# Patient Record
Sex: MALE | URBAN - METROPOLITAN AREA
[De-identification: names, ages, dates, MRNs, and addresses within clinical notes are randomized per-mention and may not be internally consistent; named-entity substitution may affect disease eponyms.]

---

## 2020-05-15 ENCOUNTER — APPOINTMENT (OUTPATIENT)
Dept: GENERAL RADIOLOGY | Facility: CLINIC | Age: 45
End: 2020-05-15
Attending: PHYSICIAN ASSISTANT
Payer: COMMERCIAL

## 2020-05-15 ENCOUNTER — APPOINTMENT (OUTPATIENT)
Dept: CT IMAGING | Facility: CLINIC | Age: 45
End: 2020-05-15
Attending: PHYSICIAN ASSISTANT
Payer: COMMERCIAL

## 2020-05-15 ENCOUNTER — HOSPITAL ENCOUNTER (EMERGENCY)
Facility: CLINIC | Age: 45
Discharge: HOME | End: 2020-05-15
Attending: PHYSICIAN ASSISTANT | Admitting: PHYSICIAN ASSISTANT
Payer: COMMERCIAL

## 2020-05-15 VITALS
SYSTOLIC BLOOD PRESSURE: 133 MMHG | RESPIRATION RATE: 16 BRPM | DIASTOLIC BLOOD PRESSURE: 79 MMHG | TEMPERATURE: 98.1 F | WEIGHT: 220 LBS | HEART RATE: 67 BPM | OXYGEN SATURATION: 99 %

## 2020-05-15 DIAGNOSIS — S06.0X9A CONCUSSION WITH LOSS OF CONSCIOUSNESS, INITIAL ENCOUNTER: ICD-10-CM

## 2020-05-15 DIAGNOSIS — S63.502A LEFT WRIST SPRAIN, INITIAL ENCOUNTER: ICD-10-CM

## 2020-05-15 DIAGNOSIS — S50.01XA CONTUSION OF RIGHT ELBOW, INITIAL ENCOUNTER: ICD-10-CM

## 2020-05-15 PROCEDURE — 73080 X-RAY EXAM OF ELBOW: CPT | Mod: RT

## 2020-05-15 PROCEDURE — 70450 CT HEAD/BRAIN W/O DYE: CPT

## 2020-05-15 PROCEDURE — 70486 CT MAXILLOFACIAL W/O DYE: CPT

## 2020-05-15 PROCEDURE — 73110 X-RAY EXAM OF WRIST: CPT | Mod: 50

## 2020-05-15 PROCEDURE — 99285 EMERGENCY DEPT VISIT HI MDM: CPT | Mod: 25

## 2020-05-15 RX ORDER — ACETAMINOPHEN 500 MG
1000 TABLET ORAL ONCE
Status: DISCONTINUED | OUTPATIENT
Start: 2020-05-15 | End: 2020-05-15 | Stop reason: HOSPADM

## 2020-05-15 RX ORDER — IBUPROFEN 600 MG/1
600 TABLET, FILM COATED ORAL ONCE
Status: DISCONTINUED | OUTPATIENT
Start: 2020-05-15 | End: 2020-05-15 | Stop reason: HOSPADM

## 2020-05-15 ASSESSMENT — ENCOUNTER SYMPTOMS
VOMITING: 0
ARTHRALGIAS: 1
COLOR CHANGE: 1
FACIAL SWELLING: 1
WOUND: 0
DIZZINESS: 1
NECK PAIN: 0

## 2020-05-15 NOTE — DISCHARGE INSTRUCTIONS
Discharge Instructions  Concussion    You were seen today for signs of a concussion.  The symptoms will vary, depending on the nature of your injury and your health. You may have: headache, confusion, nausea (feel sick to your stomach), vomiting (throwing up) and problems with memory, concentrating, or sleep. You may feel dizzy, irritable, and tired. Children and teens may need help from their parents, teachers, and coaches to watch for symptoms as they recover.    Generally, every Emergency Department visit should have a follow-up clinic visit with either a primary or a specialty clinic/provider. Please follow-up as instructed by your emergency provider today.     Return to the Emergency Department if:  Your headache gets worse or you start to have a really bad headache even with the recommended treatment plan.   You feel drowsier, have growing confusion, or slurred speech.   You keep repeating yourself.   You have strange behavior or are feeling more irritable.   You have a seizure.   You vomit (throw up) more than once.   You have trouble walking.   You have weakness or numbness.  Your neck pain gets worse.   You have a loss of consciousness.   You have blood for fluid coming from your ears or nose.   You have new symptoms or anything that worries you.     Home Care:  Get lots of rest and get enough sleep at night. Take daytime naps or rest if you feel tired.   Limit physical activity and  thinking  activities. These can make symptoms worse.   Physical activities include gym, sports, weight training, running, exercise, and heavy lifting.   Thinking activities include homework, class work, job-related work, and screen time (phone, computer, tablet, TV, and video games).   Stick to a healthy diet and drink lots of fluids. Avoid alcohol.  As symptoms improve, you may slowly return to your daily activities. If symptoms get worse or return, reduce your activity.   Know that it is normal to feel sad or frustrated when  you do not feel right and are less active.     Going Back to Work:  Your care team will tell you when you are ready to return to work.    Limit the amount of work you do soon after your injury. This may speed healing. Take breaks if your symptoms get worse. You should also reduce your physical activity as well as activities that require a lot of thinking until you see your doctor. You may need shorter work days and a lighter workload.  Avoid heavy lifting, working with machinery, driving and working at heights until your symptoms are gone or you are cleared by a provider.    Going Back to School:  If you are still having symptoms, you may need extra help at school.  Tell your teachers and school nurse about your injury and symptoms. Ask them to watch for problems with learning, memory, and concentrating. Symptoms may get worse when you do schoolwork, and you may become more irritable. You may need shorter school days, a reduced workload, and to postpone testing.  Do not drive or take gym class (physical activity) until cleared by a provider.    Returning to Sports:  Never return to play if you have any symptoms. A full recovery will reduce the chances of getting hurt again. Remember, it is better to miss one or two games than a whole season.  You should rest from all physical activity until you see your provider. Generally, if all symptoms have completely cleared, your provider can help guide you to slowly return to sports. If symptoms return or worsen, stop the activity and see your provider.  Important: If you are in an organized sport and under age 18, you will need written consent from a healthcare provider before you return to sports. Typically, this will be your primary care or sports medicine provider. Please make an appointment.    If you were given a prescription for medicine here today, be sure to read all of the information (including the package insert) that comes with your prescription.  This will  include important information about the medicine, its side effects, and any warnings that you need to know about.  The pharmacist who fills the prescription can provide more information and answer questions you may have about the medicine.  If you have questions or concerns that the pharmacist cannot address, please call or return to the Emergency Department.     Remember that you can always come back to the Emergency Department if you are not able to see your regular provider in the amount of time listed above, if you get any new symptoms, or if there is anything that worries you.  Discharge Instructions  Extremity Injury    You were seen today for an injury to an extremity (arm, hand, leg, or foot). You may have a bruise, strain, or fracture (broken bone).    Generally, every Emergency Department visit should have a follow-up clinic visit with either a primary or a specialty clinic/provider. Please follow-up as instructed by your emergency provider today.  Return to the Emergency Department right away if:  Your pain seems to change or get worse or there is pain in a new area that wasn t evaluated today.  Your extremity becomes pale, cool, blue, or numb or tingling past the injury.  You have more drainage, redness or pain in the area of the cut or abrasion.  You have pain that you cannot control with the medicine recommended or prescribed here, or you have pain that seems too much for your injury.  Your child (who is injured) will not stop crying or is much more fussy than normal.  You have new symptoms or anything that worries you.    What to Expect:  Your swelling and pain may be worse the day after your injury, but should not be severe and should start getting better after that. You should not have new symptoms and your pain should not get worse.  You may start to get a bruise over the injured area or below the injured area (bruising can follow gravity).  Your movement and strength should get better with  time.  Some injuries may not show up until after you have left the Emergency Department so it is important to follow-up as directed.  Your injury may prevent you from working.  Follow-up with your regular provider to get a work release note.  Pain medications or your injury may make it unsafe to drive or operate machinery.    Home Care:  RICE: Rest, Ice, Compression, Elevation  Rest: Rest your injured area for at least 1-2 days. After that you may start using your extremity again as long as there is not too much pain.   Ice: Apply ice your injured area for 15 minutes at a time, at least 3 times a day. Use a cloth between the ice bag and your skin to prevent frostbite. Do not sleep with an ice pack or heating pad on, since this can cause burns or skin injury.  Compression: You may use an elastic bandage (Ace  Wrap) if it makes you more comfortable. Wrap it just tight enough to provide light compression, like a new pair of socks feels. Loosen the bandage if you have swelling past the bandage.  Elevation: Raise the injured area above the level of your heart as much as possible in the first 1-2 days.    Use Tylenol  (acetaminophen), Motrin (ibuprofen), or Advil  (ibuprofen) for your pain unless you have an allergy or are told not to use these medications by your provider.  Take the medications as instructed on the package. Tylenol  (acetaminophen) is in many prescription medicines and non-prescription medicines--check all of your medicines to be sure you aren t taking more than 3000 mg per day.  Please follow any other instructions that were discussed with you by your provider.    Stretching/Exercises:  You may have been provided with instructions for stretching or exercises. If your injury was to your arm or shoulder and your provider put you in a sling or an immobilizer, it is important that you take off your immobilizer within 3 days and stretch/move your shoulder, unless your provider specifically tells you to not  move your shoulder.  This is to prevent further injury such as a  frozen shoulder .     If you were given a prescription for medicine here today, be sure to read all of the information (including the package insert) that comes with your prescription.  This will include important information about the medicine, its side effects, and any warnings that you need to know about.  The pharmacist who fills the prescription can provide more information and answer questions you may have about the medicine.  If you have questions or concerns that the pharmacist cannot address, please call or return to the Emergency Department.     Remember that you can always come back to the Emergency Department if you are not able to see your regular provider in the amount of time listed above, if you get any new symptoms, or if there is anything that worries you.

## 2020-05-15 NOTE — ED AVS SNAPSHOT
Emergency Department  [address] [place] [address]  Phone:  [phone]  Fax:  [phone]                                    Ankit Lopez   MRN: [id number]    Department:   Emergency Department   Date of Visit:  5/15/2020           After Visit Summary Signature Page    I have received my discharge instructions, and my questions have been answered. I have discussed any challenges I see with this plan with the nurse or doctor.    ..........................................................................................................................................  Patient/Patient Representative Signature      ..........................................................................................................................................  Patient Representative Print Name and Relationship to Patient    ..................................................               ................................................  Date                                   Time    ..........................................................................................................................................  Reviewed by Signature/Title    ...................................................              ..............................................  Date                                               Time          22EPIC Rev 08/18

## 2020-05-15 NOTE — ED PROVIDER NOTES
History     Chief Complaint:  Wrist Pain & Fall      HPI   A [employer] phone  was used to obtain the below history as well as to explain diagnosis, plan of treatment, reasons to return to the emergency department and appropriate follow up.   Ankit Lopez is an otherwise healthy 44 year old male who presents to the ED for evaluation of wrist pain after a fall. The patient reports that yesterday afternoon he tripped on a crack in the sidewalk and fell forward. He fell onto his outstretched arms and struck his left cheek against the concrete. The patient endorses loss of consciousness during the incident and was unable to get up for quite some time. Afterwards, he states that he felt disoriented and dizzy upon standing throughout the day, which has persisted into today. The patient also began to experience pain in the right cheek, bilateral wrists, and right elbow. He decided to present to the ED today secondary to the persistence of his wrist and face pain symptoms, and here he presents with bruising to the left cheek and left wrist. He additionally endorses some mild left patellar pain but has been ambulatory since the incident. He denies any neck pain, vomiting, or other injuries.  His dizziness has improved some.  He denies any weakness or tingling in the arms or legs.    Allergies:  No known drug allergies.     Medications:    The patient is currently on no regular medications.     Past Medical History:    History reviewed.  No significant past medical history.      Past Surgical History:    History reviewed. No pertinent past surgical history.     Family History:    History reviewed. No pertinent family history.     Social History:  Marital Status:  Single   Smoking status: No   Alcohol use: No       Review of Systems   HENT: Positive for facial swelling (and pain).    Gastrointestinal: Negative for vomiting.   Musculoskeletal: Positive for arthralgias (bilateral wrists, R elbow). Negative for  neck pain.   Skin: Positive for color change (bruising). Negative for wound.   Neurological: Positive for dizziness.   All other systems reviewed and are negative.    Physical Exam     Patient Vitals for the past 24 hrs:   BP Temp Temp src Pulse Resp SpO2 Weight   05/15/20 1510 133/79 98.1  F (36.7  C) Oral 67 16 99 % 99.8 kg (220 lb)        Physical Exam  General: Alert and cooperative with exam. Resting comfortably on gurney  Head:  Scalp is NC/AT without bruising, hematomas.  Eyes:  No scleral icterus, PERRL, EOMI   ENT:  The external nose and ears are normal.    TM's normal bilaterally    There is bruising over left Maxillary bone with tenderness.  Facial bones otherwise normal.     The oropharynx is normal without evidence of dental trauma or intraoral lacerations.  Neck:  Normal range of motion without rigidity. Able to rotate 45 degrees BL.  CV:  Regular rate and rhythm    No pathologic murmur, rubs, or gallops.  Resp:  Breath sounds are clear bilaterally.  No stridor in neck.    Non-labored, no retractions or accessory muscle use  Abdomen: Abdomen is soft, no distension, no tenderness, no masses.  Bowel sounds present in all quadrants.  No flank tenderness.  MS:  No midline cervical, thoracic, or lumbar tenderness    There is tenderness over left distal ulna with slight swelling.  There is tenderness over right elbow without swelling.  No anatomic snuffbox tenderness or pain with axial loading of thumbs BL.  No carpal tenderness BL.    PROM of all other major joints performed and unremarkable.  Skin:  Warm and dry, No bruising.  Neuro:  Oriented x 3. No gross motor deficits.    Strength and sensation grossly intact in all 4 extremities.  Cranial nerves  2-12 intact. GCS: 15. Normal finger to nose and heel to shin testing. Gait normal.  Psych: Awake. Alert. Normal affect. Appropriate interactions.      Emergency Department Course   Imaging:  Radiographic findings were communicated with the patient who voiced  understanding of the findings.  XR Elbow 3 views, Right:   No fractures are evident. No joint effusion, as per radiology.     XR Wrist 3 views, Bilateral:   Soft tissue swelling in the left wrist more prominent on   the ulnar margin. No fractures are evident. Normal joint spacing and   alignment, as per radiology.     CT Head without contrast:   Normal head CT.    Radiation dose for this scan was reduced using automated exposure   control, adjustment of the mA and/or kV according to patient size, or   iterative reconstruction technique.  As per radiology.    CT Facial Bones without contrast:   There are no facial bone fractures. The paranasal sinuses   are well aerated. The orbits and globes bilaterally are unremarkable.   The mastoid air cells are well aerated. Temporomandibular joint   alignment bilaterally is normal.    Radiation dose for this scan was reduced using automated exposure   control, adjustment of the mA and/or kV according to patient size, or   iterative reconstruction technique.   As per radiology.      Emergency Department Course:  Nursing notes and vitals reviewed. (1520) I performed an initial exam of the patient as documented above.     (1537)  acquired and further history obtained.    The patient was sent for x-rays of the bilateral wrists and right elbow, as well as CT's of the head and facial bones, while in the emergency department, findings above.     (1650) I rechecked the patient and discussed the results of his workup thus far.     Findings and plan explained to the Patient. Patient discharged home with instructions regarding supportive care, medications, and reasons to return. The importance of close follow-up was reviewed.    I personally reviewed the imaging results with the Patient and answered all related questions prior to discharge.     Impression & Plan    Medical Decision Makin yo male presents after mechanical fall.  Hx and records reviewed.  Fortunately Ct  scans of head and facial bones normal.  C-spine cleared clinically using NEXUS criteria.  X-rays of BL wrists show no evidence of fx or dislocation.  There is some left sided wrist swelling over distal ulna without laxity and thus will give brace and treat as sprain.  Doubt occult scaphoid fxs given exam findings. Elbow x-ray also normal though will ACE wrap as likely contusion.  Head to toe trauma exam otherwise reassuring.    My impression is concussion, and wrist and elbow soft tissue injuries.  Conservative treatment with RICE, NSAIDS, follow-up with pcp.  Advised that repeat imaging of joints will be indicated in 1 week if symptoms persist to exclude possibility of occult fxs.  Patient expressed understanding of this and will follow-up with a pcp or return if this is the case.  Advised to return sooner if other new or worsening sxs.     Diagnosis:    ICD-10-CM    1. Contusion of right elbow, initial encounter  S50.01XA    2. Left wrist sprain, initial encounter  S63.502A    3. Concussion with loss of consciousness, initial encounter  S06.0X9A        Disposition:  discharged to home    Discharge Medications:  None    Scribe Disclosure:  I, Humaira Barrow, am serving as a scribe on 5/15/2020 at 3:11 PM to personally document services performed by Hai Venegas PA-C based on my observations and the provider's statements to me.      Humaira Barrow  5/15/2020    EMERGENCY DEPARTMENT       Hai Venegas PA-C  05/16/20 [id number]